# Patient Record
Sex: MALE | URBAN - METROPOLITAN AREA
[De-identification: names, ages, dates, MRNs, and addresses within clinical notes are randomized per-mention and may not be internally consistent; named-entity substitution may affect disease eponyms.]

---

## 2024-10-17 ENCOUNTER — CLAIMS CREATED FROM THE CLAIM WINDOW (OUTPATIENT)
Dept: URBAN - METROPOLITAN AREA MEDICAL CENTER 1 | Facility: MEDICAL CENTER | Age: 79
End: 2024-10-17
Payer: OTHER GOVERNMENT

## 2024-10-17 DIAGNOSIS — K57.31 DIVERTICULAR HEMORRHAGE: ICD-10-CM

## 2024-10-17 DIAGNOSIS — K92.1 ACUTE MELENA: ICD-10-CM

## 2024-10-17 PROCEDURE — 45380 COLONOSCOPY AND BIOPSY: CPT | Performed by: INTERNAL MEDICINE

## 2024-10-17 PROCEDURE — 43235 EGD DIAGNOSTIC BRUSH WASH: CPT | Performed by: INTERNAL MEDICINE

## 2024-10-18 ENCOUNTER — CLAIMS CREATED FROM THE CLAIM WINDOW (OUTPATIENT)
Dept: URBAN - METROPOLITAN AREA MEDICAL CENTER 1 | Facility: MEDICAL CENTER | Age: 79
End: 2024-10-18
Payer: OTHER GOVERNMENT

## 2024-10-18 DIAGNOSIS — D62 ABLA (ACUTE BLOOD LOSS ANEMIA): ICD-10-CM

## 2024-10-18 DIAGNOSIS — K92.1 ACUTE MELENA: ICD-10-CM

## 2024-10-18 DIAGNOSIS — K57.30 ACQUIRED DIVERTICULOSIS OF COLON: ICD-10-CM

## 2024-10-18 PROCEDURE — 99232 SBSQ HOSP IP/OBS MODERATE 35: CPT | Performed by: INTERNAL MEDICINE

## 2024-10-19 ENCOUNTER — CLAIMS CREATED FROM THE CLAIM WINDOW (OUTPATIENT)
Dept: URBAN - METROPOLITAN AREA MEDICAL CENTER 1 | Facility: MEDICAL CENTER | Age: 79
End: 2024-10-19
Payer: OTHER GOVERNMENT

## 2024-10-19 DIAGNOSIS — K57.30 ACQUIRED DIVERTICULOSIS OF COLON: ICD-10-CM

## 2024-10-19 DIAGNOSIS — D69.6 ACQUIRED AMEGAKARYOCYTIC THROMBOCYTOPENIA: ICD-10-CM

## 2024-10-19 DIAGNOSIS — K92.1 ACUTE MELENA: ICD-10-CM

## 2024-10-19 DIAGNOSIS — D62 ABLA (ACUTE BLOOD LOSS ANEMIA): ICD-10-CM

## 2024-10-19 PROCEDURE — 99232 SBSQ HOSP IP/OBS MODERATE 35: CPT | Performed by: INTERNAL MEDICINE

## 2024-10-20 ENCOUNTER — CLAIMS CREATED FROM THE CLAIM WINDOW (OUTPATIENT)
Dept: URBAN - METROPOLITAN AREA MEDICAL CENTER 1 | Facility: MEDICAL CENTER | Age: 79
End: 2024-10-20
Payer: OTHER GOVERNMENT

## 2024-10-20 DIAGNOSIS — D62 ABLA (ACUTE BLOOD LOSS ANEMIA): ICD-10-CM

## 2024-10-20 DIAGNOSIS — K92.1 ACUTE MELENA: ICD-10-CM

## 2024-10-20 DIAGNOSIS — D69.6 ACQUIRED AMEGAKARYOCYTIC THROMBOCYTOPENIA: ICD-10-CM

## 2024-10-20 DIAGNOSIS — K57.30 ACQUIRED DIVERTICULOSIS OF COLON: ICD-10-CM

## 2024-10-20 PROCEDURE — 99232 SBSQ HOSP IP/OBS MODERATE 35: CPT | Performed by: INTERNAL MEDICINE

## 2025-01-21 ENCOUNTER — DASHBOARD ENCOUNTERS (OUTPATIENT)
Age: 80
End: 2025-01-21

## 2025-01-23 ENCOUNTER — OFFICE VISIT (OUTPATIENT)
Dept: URBAN - METROPOLITAN AREA MEDICAL CENTER 42 | Facility: MEDICAL CENTER | Age: 80
End: 2025-01-23

## 2025-01-23 ENCOUNTER — OFFICE VISIT (OUTPATIENT)
Dept: URBAN - METROPOLITAN AREA CLINIC 70 | Facility: CLINIC | Age: 80
End: 2025-01-23

## 2025-02-20 ENCOUNTER — OFFICE VISIT (OUTPATIENT)
Dept: URBAN - METROPOLITAN AREA CLINIC 70 | Facility: CLINIC | Age: 80
End: 2025-02-20
Payer: OTHER GOVERNMENT

## 2025-02-20 VITALS
BODY MASS INDEX: 30.21 KG/M2 | WEIGHT: 204 LBS | HEART RATE: 55 BPM | SYSTOLIC BLOOD PRESSURE: 147 MMHG | OXYGEN SATURATION: 97 % | DIASTOLIC BLOOD PRESSURE: 78 MMHG | HEIGHT: 69 IN | TEMPERATURE: 97.7 F

## 2025-02-20 DIAGNOSIS — K92.2 LGI BLEED: ICD-10-CM

## 2025-02-20 DIAGNOSIS — Z12.11 COLON CANCER SCREENING: ICD-10-CM

## 2025-02-20 PROCEDURE — 99214 OFFICE O/P EST MOD 30 MIN: CPT | Performed by: NURSE PRACTITIONER

## 2025-02-20 RX ORDER — GABAPENTIN 300 MG/1
1 CAPSULE CAPSULE ORAL ONCE A DAY
Status: ACTIVE | COMMUNITY

## 2025-02-20 RX ORDER — TADALAFIL 20 MG/1
1 TABLET AS NEEDED TABLET, FILM COATED ORAL ONCE A DAY
Status: ACTIVE | COMMUNITY

## 2025-02-20 RX ORDER — SOLIFENACIN SUCCINATE 5 MG/1
1 TABLET TABLET, FILM COATED ORAL ONCE A DAY
Status: ACTIVE | COMMUNITY

## 2025-02-20 RX ORDER — BRIMONIDINE TARTRATE 1.5 MG/ML
1 DROP INTO AFFECTED EYE SOLUTION/ DROPS OPHTHALMIC
Status: ACTIVE | COMMUNITY

## 2025-02-20 RX ORDER — FERROUS SULFATE TAB EC 324 MG (65 MG FE EQUIVALENT) 324 (65 FE) MG
1 TABLET TABLET DELAYED RESPONSE ORAL DAILY
Status: ACTIVE | COMMUNITY

## 2025-02-20 RX ORDER — CARVEDILOL 6.25 MG/1
1 TABLET WITH FOOD TABLET, FILM COATED ORAL TWICE A DAY
Status: ACTIVE | COMMUNITY

## 2025-02-20 RX ORDER — PANTOPRAZOLE SODIUM 40 MG/1
1 TABLET 1/2 TO 1 HOUR BEFORE MORNING MEAL TABLET, DELAYED RELEASE ORAL ONCE A DAY
Status: ACTIVE | COMMUNITY

## 2025-02-20 RX ORDER — IBUPROFEN 200 MG
1 TABLET WITH FOOD CAPSULE ORAL TWICE A DAY
Status: ACTIVE | COMMUNITY

## 2025-02-20 RX ORDER — POLYETHYLENE GLYCOL 400
AS DIRECTED LIQUID (ML) MISCELLANEOUS
Status: ACTIVE | COMMUNITY

## 2025-02-20 RX ORDER — LISINOPRIL 20 MG/1
1 TABLET TABLET ORAL ONCE A DAY
Status: ACTIVE | COMMUNITY

## 2025-02-20 RX ORDER — TAMSULOSIN HYDROCHLORIDE 0.4 MG/1
1 CAPSULE CAPSULE ORAL ONCE A DAY
Status: ACTIVE | COMMUNITY

## 2025-02-20 RX ORDER — ALBUTEROL SULFATE 90 UG/1
1 PUFF AS NEEDED AEROSOL, METERED RESPIRATORY (INHALATION)
Status: ACTIVE | COMMUNITY

## 2025-02-20 RX ORDER — PRAVASTATIN SODIUM 20 MG/1
1 TABLET TABLET ORAL ONCE A DAY
Status: ACTIVE | COMMUNITY

## 2025-02-20 NOTE — HPI-TODAY'S VISIT:
Patient is a 78 y/o male who presents today accompanied by his son as a hospital follow up. He was admitted to hospital 10/11/24-10/23/24 for a GI bleed with etiology diverticular in nature with associated severe anemia (received 8 units PRBCs and 4 units FFP). CTA upon admission was negative. Initial colonoscopy 10/14/24 with no active bleeding. He then had recurrent bleeding on with repeat CTA showing active bleeding likely LGI. EGD 10/15/24 was normal with no source of bleed. Flex sig 10/16/24 showed large blood clot in rectum. IR attempted mesenteric arteriogram but bleeding stopped. Had f/u colonoscopy 10/17/24 showing large blood clot in diverticulum in hepatic flexure s/p endoclip. Since hospital discharge ~ 4 months ago he has had no further episodes of GI bleeding. VSS. No current GI complaints. Denies abdominal pain, wt loss, N/v, constipation, or diarrhea. He reports feeling well and son states he had been eating well with +wt gain.